# Patient Record
Sex: MALE | Race: WHITE | NOT HISPANIC OR LATINO | Employment: OTHER | URBAN - METROPOLITAN AREA
[De-identification: names, ages, dates, MRNs, and addresses within clinical notes are randomized per-mention and may not be internally consistent; named-entity substitution may affect disease eponyms.]

---

## 2018-11-25 ENCOUNTER — HOSPITAL ENCOUNTER (EMERGENCY)
Facility: HOSPITAL | Age: 43
Discharge: HOME | End: 2018-11-25
Attending: EMERGENCY MEDICINE | Admitting: EMERGENCY MEDICINE

## 2018-11-25 ENCOUNTER — APPOINTMENT (OUTPATIENT)
Dept: CT IMAGING | Facility: HOSPITAL | Age: 43
End: 2018-11-25

## 2018-11-25 VITALS
OXYGEN SATURATION: 98 % | HEART RATE: 92 BPM | DIASTOLIC BLOOD PRESSURE: 88 MMHG | TEMPERATURE: 97.8 F | WEIGHT: 187.9 LBS | SYSTOLIC BLOOD PRESSURE: 129 MMHG | HEIGHT: 75 IN | BODY MASS INDEX: 23.36 KG/M2 | RESPIRATION RATE: 16 BRPM

## 2018-11-25 DIAGNOSIS — N20.1 LEFT URETERAL STONE: Primary | ICD-10-CM

## 2018-11-25 LAB
ALBUMIN SERPL-MCNC: 4.5 G/DL (ref 3.5–5.2)
ALBUMIN/GLOB SERPL: 1.4 G/DL
ALP SERPL-CCNC: 94 U/L (ref 39–117)
ALT SERPL W P-5'-P-CCNC: 20 U/L (ref 1–41)
ANION GAP SERPL CALCULATED.3IONS-SCNC: 14.4 MMOL/L
AST SERPL-CCNC: 16 U/L (ref 1–40)
BACTERIA UR QL AUTO: ABNORMAL /HPF
BASOPHILS # BLD AUTO: 0.04 10*3/MM3 (ref 0–0.2)
BASOPHILS NFR BLD AUTO: 0.3 % (ref 0–1.5)
BILIRUB SERPL-MCNC: 0.5 MG/DL (ref 0.1–1.2)
BILIRUB UR QL STRIP: NEGATIVE
BUN BLD-MCNC: 13 MG/DL (ref 6–20)
BUN/CREAT SERPL: 11.1 (ref 7–25)
CALCIUM SPEC-SCNC: 9.3 MG/DL (ref 8.6–10.5)
CHLORIDE SERPL-SCNC: 103 MMOL/L (ref 98–107)
CLARITY UR: CLEAR
CO2 SERPL-SCNC: 25.6 MMOL/L (ref 22–29)
COLOR UR: YELLOW
CREAT BLD-MCNC: 1.17 MG/DL (ref 0.76–1.27)
DEPRECATED RDW RBC AUTO: 39.2 FL (ref 37–54)
EOSINOPHIL # BLD AUTO: 0.18 10*3/MM3 (ref 0–0.7)
EOSINOPHIL NFR BLD AUTO: 1.4 % (ref 0.3–6.2)
ERYTHROCYTE [DISTWIDTH] IN BLOOD BY AUTOMATED COUNT: 12.2 % (ref 11.5–14.5)
GFR SERPL CREATININE-BSD FRML MDRD: 68 ML/MIN/1.73
GLOBULIN UR ELPH-MCNC: 3.3 GM/DL
GLUCOSE BLD-MCNC: 147 MG/DL (ref 65–99)
GLUCOSE UR STRIP-MCNC: NEGATIVE MG/DL
HCT VFR BLD AUTO: 42 % (ref 40.4–52.2)
HGB BLD-MCNC: 15 G/DL (ref 13.7–17.6)
HGB UR QL STRIP.AUTO: ABNORMAL
HYALINE CASTS UR QL AUTO: ABNORMAL /LPF
IMM GRANULOCYTES # BLD: 0.03 10*3/MM3 (ref 0–0.03)
IMM GRANULOCYTES NFR BLD: 0.2 % (ref 0–0.5)
KETONES UR QL STRIP: NEGATIVE
LEUKOCYTE ESTERASE UR QL STRIP.AUTO: ABNORMAL
LIPASE SERPL-CCNC: 20 U/L (ref 13–60)
LYMPHOCYTES # BLD AUTO: 2.06 10*3/MM3 (ref 0.9–4.8)
LYMPHOCYTES NFR BLD AUTO: 16.3 % (ref 19.6–45.3)
MCH RBC QN AUTO: 31.6 PG (ref 27–32.7)
MCHC RBC AUTO-ENTMCNC: 35.7 G/DL (ref 32.6–36.4)
MCV RBC AUTO: 88.6 FL (ref 79.8–96.2)
MONOCYTES # BLD AUTO: 0.89 10*3/MM3 (ref 0.2–1.2)
MONOCYTES NFR BLD AUTO: 7.1 % (ref 5–12)
NEUTROPHILS # BLD AUTO: 9.43 10*3/MM3 (ref 1.9–8.1)
NEUTROPHILS NFR BLD AUTO: 74.9 % (ref 42.7–76)
NITRITE UR QL STRIP: NEGATIVE
PH UR STRIP.AUTO: 6.5 [PH] (ref 5–8)
PLATELET # BLD AUTO: 246 10*3/MM3 (ref 140–500)
PMV BLD AUTO: 10.2 FL (ref 6–12)
POTASSIUM BLD-SCNC: 3.8 MMOL/L (ref 3.5–5.2)
PROT SERPL-MCNC: 7.8 G/DL (ref 6–8.5)
PROT UR QL STRIP: ABNORMAL
RBC # BLD AUTO: 4.74 10*6/MM3 (ref 4.6–6)
RBC # UR: ABNORMAL /HPF
REF LAB TEST METHOD: ABNORMAL
SODIUM BLD-SCNC: 143 MMOL/L (ref 136–145)
SP GR UR STRIP: 1.02 (ref 1–1.03)
SQUAMOUS #/AREA URNS HPF: ABNORMAL /HPF
UROBILINOGEN UR QL STRIP: ABNORMAL
WBC NRBC COR # BLD: 12.6 10*3/MM3 (ref 4.5–10.7)
WBC UR QL AUTO: ABNORMAL /HPF

## 2018-11-25 PROCEDURE — 99283 EMERGENCY DEPT VISIT LOW MDM: CPT

## 2018-11-25 PROCEDURE — 25010000002 KETOROLAC TROMETHAMINE PER 15 MG: Performed by: PHYSICIAN ASSISTANT

## 2018-11-25 PROCEDURE — 80053 COMPREHEN METABOLIC PANEL: CPT | Performed by: PHYSICIAN ASSISTANT

## 2018-11-25 PROCEDURE — 96374 THER/PROPH/DIAG INJ IV PUSH: CPT

## 2018-11-25 PROCEDURE — 85025 COMPLETE CBC W/AUTO DIFF WBC: CPT | Performed by: PHYSICIAN ASSISTANT

## 2018-11-25 PROCEDURE — 74176 CT ABD & PELVIS W/O CONTRAST: CPT

## 2018-11-25 PROCEDURE — 83690 ASSAY OF LIPASE: CPT | Performed by: PHYSICIAN ASSISTANT

## 2018-11-25 PROCEDURE — 96375 TX/PRO/DX INJ NEW DRUG ADDON: CPT

## 2018-11-25 PROCEDURE — 81001 URINALYSIS AUTO W/SCOPE: CPT | Performed by: PHYSICIAN ASSISTANT

## 2018-11-25 PROCEDURE — 96361 HYDRATE IV INFUSION ADD-ON: CPT

## 2018-11-25 RX ORDER — SODIUM CHLORIDE 0.9 % (FLUSH) 0.9 %
10 SYRINGE (ML) INJECTION AS NEEDED
Status: DISCONTINUED | OUTPATIENT
Start: 2018-11-25 | End: 2018-11-25 | Stop reason: HOSPADM

## 2018-11-25 RX ORDER — KETOROLAC TROMETHAMINE 30 MG/ML
30 INJECTION, SOLUTION INTRAMUSCULAR; INTRAVENOUS ONCE
Status: COMPLETED | OUTPATIENT
Start: 2018-11-25 | End: 2018-11-25

## 2018-11-25 RX ORDER — IBUPROFEN 200 MG
200 TABLET ORAL EVERY 6 HOURS PRN
COMMUNITY

## 2018-11-25 RX ORDER — OXYCODONE HYDROCHLORIDE AND ACETAMINOPHEN 5; 325 MG/1; MG/1
1 TABLET ORAL EVERY 6 HOURS PRN
Qty: 15 TABLET | Refills: 0 | OUTPATIENT
Start: 2018-11-25 | End: 2023-03-15

## 2018-11-25 RX ORDER — TAMSULOSIN HYDROCHLORIDE 0.4 MG/1
1 CAPSULE ORAL NIGHTLY
Qty: 20 CAPSULE | Refills: 0 | OUTPATIENT
Start: 2018-11-25 | End: 2023-03-15

## 2018-11-25 RX ORDER — ONDANSETRON 4 MG/1
4 TABLET, ORALLY DISINTEGRATING ORAL EVERY 8 HOURS PRN
Qty: 15 TABLET | Refills: 0 | OUTPATIENT
Start: 2018-11-25 | End: 2023-03-15

## 2018-11-25 RX ORDER — LORAZEPAM 2 MG/ML
1 INJECTION INTRAMUSCULAR ONCE
Status: DISCONTINUED | OUTPATIENT
Start: 2018-11-25 | End: 2018-11-25

## 2018-11-25 RX ADMIN — SODIUM CHLORIDE 1000 ML: 9 INJECTION, SOLUTION INTRAVENOUS at 03:26

## 2018-11-25 RX ADMIN — KETOROLAC TROMETHAMINE 30 MG: 30 INJECTION, SOLUTION INTRAMUSCULAR at 03:29

## 2023-03-15 ENCOUNTER — HOSPITAL ENCOUNTER (EMERGENCY)
Facility: HOSPITAL | Age: 48
Discharge: HOME | End: 2023-03-15
Attending: EMERGENCY MEDICINE | Admitting: EMERGENCY MEDICINE
Payer: MEDICAID

## 2023-03-15 VITALS
TEMPERATURE: 99.1 F | OXYGEN SATURATION: 99 % | RESPIRATION RATE: 18 BRPM | WEIGHT: 205 LBS | HEIGHT: 75 IN | BODY MASS INDEX: 25.49 KG/M2 | SYSTOLIC BLOOD PRESSURE: 146 MMHG | DIASTOLIC BLOOD PRESSURE: 95 MMHG | HEART RATE: 85 BPM

## 2023-03-15 DIAGNOSIS — B02.8 HERPES ZOSTER WITH COMPLICATION: Primary | ICD-10-CM

## 2023-03-15 PROCEDURE — 99282 EMERGENCY DEPT VISIT SF MDM: CPT

## 2023-03-27 ENCOUNTER — OFFICE VISIT (OUTPATIENT)
Dept: FAMILY MEDICINE CLINIC | Facility: CLINIC | Age: 48
End: 2023-03-27
Payer: MEDICAID

## 2023-03-27 DIAGNOSIS — B02.8 HERPES ZOSTER WITH OTHER COMPLICATION: Primary | ICD-10-CM

## 2023-03-27 DIAGNOSIS — F41.9 ANXIETY: ICD-10-CM

## 2023-03-27 RX ORDER — ACETAMINOPHEN 500 MG
500 TABLET ORAL EVERY 6 HOURS PRN
COMMUNITY

## 2023-03-27 RX ORDER — MULTIVIT WITH MINERALS/LUTEIN
250 TABLET ORAL DAILY
COMMUNITY

## 2023-03-27 RX ORDER — MULTIPLE VITAMINS W/ MINERALS TAB 9MG-400MCG
1 TAB ORAL DAILY
COMMUNITY

## 2023-03-29 VITALS
SYSTOLIC BLOOD PRESSURE: 150 MMHG | OXYGEN SATURATION: 97 % | BODY MASS INDEX: 25.99 KG/M2 | HEART RATE: 95 BPM | HEIGHT: 75 IN | WEIGHT: 209 LBS | DIASTOLIC BLOOD PRESSURE: 114 MMHG | TEMPERATURE: 98.4 F

## 2023-03-29 PROBLEM — B02.9 SHINGLES: Status: ACTIVE | Noted: 2023-03-29

## 2023-03-29 PROBLEM — F41.9 ANXIETY: Status: ACTIVE | Noted: 2023-03-29

## 2023-03-31 ENCOUNTER — PATIENT ROUNDING (BHMG ONLY) (OUTPATIENT)
Dept: FAMILY MEDICINE CLINIC | Facility: CLINIC | Age: 48
End: 2023-03-31
Payer: MEDICAID

## 2023-04-11 ENCOUNTER — OFFICE VISIT (OUTPATIENT)
Dept: FAMILY MEDICINE CLINIC | Facility: CLINIC | Age: 48
End: 2023-04-11
Payer: MEDICAID

## 2023-04-11 VITALS
SYSTOLIC BLOOD PRESSURE: 132 MMHG | DIASTOLIC BLOOD PRESSURE: 78 MMHG | WEIGHT: 205 LBS | BODY MASS INDEX: 25.49 KG/M2 | HEART RATE: 73 BPM | OXYGEN SATURATION: 99 % | TEMPERATURE: 97.5 F | HEIGHT: 75 IN

## 2023-04-11 DIAGNOSIS — L02.215 PERINEAL ABSCESS: Primary | ICD-10-CM

## 2023-04-11 DIAGNOSIS — B02.9 HERPES ZOSTER WITHOUT COMPLICATION: ICD-10-CM

## 2023-04-11 PROCEDURE — 1160F RVW MEDS BY RX/DR IN RCRD: CPT | Performed by: NURSE PRACTITIONER

## 2023-04-11 PROCEDURE — 1159F MED LIST DOCD IN RCRD: CPT | Performed by: NURSE PRACTITIONER

## 2023-04-11 PROCEDURE — 99213 OFFICE O/P EST LOW 20 MIN: CPT | Performed by: NURSE PRACTITIONER

## 2023-04-11 RX ORDER — CLINDAMYCIN HYDROCHLORIDE 300 MG/1
300 CAPSULE ORAL 3 TIMES DAILY
Qty: 30 CAPSULE | Refills: 0 | Status: SHIPPED | OUTPATIENT
Start: 2023-04-11

## 2023-04-17 ENCOUNTER — TELEPHONE (OUTPATIENT)
Dept: FAMILY MEDICINE CLINIC | Facility: CLINIC | Age: 48
End: 2023-04-17
Payer: MEDICAID

## 2023-04-18 LAB
BACTERIA SPEC AEROBE CULT: ABNORMAL
BACTERIA SPEC ANAEROBE CULT: ABNORMAL
BACTERIA SPEC CULT: ABNORMAL
OTHER ANTIBIOTIC SUSC ISLT: ABNORMAL

## 2023-04-19 ENCOUNTER — TELEPHONE (OUTPATIENT)
Dept: FAMILY MEDICINE CLINIC | Facility: CLINIC | Age: 48
End: 2023-04-19

## 2023-04-19 RX ORDER — CIPROFLOXACIN 500 MG/1
500 TABLET, FILM COATED ORAL 2 TIMES DAILY
Qty: 14 TABLET | Refills: 0 | Status: SHIPPED | OUTPATIENT
Start: 2023-04-19

## 2023-04-22 PROBLEM — L02.215 PERINEAL ABSCESS: Status: ACTIVE | Noted: 2023-04-22

## 2023-05-15 ENCOUNTER — PREP FOR SURGERY (OUTPATIENT)
Dept: OTHER | Facility: HOSPITAL | Age: 48
End: 2023-05-15
Payer: MEDICAID

## 2023-05-15 ENCOUNTER — OFFICE VISIT (OUTPATIENT)
Dept: SURGERY | Facility: CLINIC | Age: 48
End: 2023-05-15
Payer: MEDICAID

## 2023-05-15 VITALS — WEIGHT: 203.6 LBS | HEIGHT: 75 IN | BODY MASS INDEX: 25.31 KG/M2

## 2023-05-15 DIAGNOSIS — Z12.11 SCREENING FOR COLORECTAL CANCER: Primary | ICD-10-CM

## 2023-05-15 DIAGNOSIS — Z12.12 SCREENING FOR COLORECTAL CANCER: Primary | ICD-10-CM

## 2023-05-15 DIAGNOSIS — L02.215 PERINEAL ABSCESS: Primary | ICD-10-CM

## 2023-05-15 PROCEDURE — 1160F RVW MEDS BY RX/DR IN RCRD: CPT | Performed by: SURGERY

## 2023-05-15 PROCEDURE — 99202 OFFICE O/P NEW SF 15 MIN: CPT | Performed by: SURGERY

## 2023-05-15 PROCEDURE — 1159F MED LIST DOCD IN RCRD: CPT | Performed by: SURGERY

## 2023-05-15 RX ORDER — SODIUM CHLORIDE 0.9 % (FLUSH) 0.9 %
10 SYRINGE (ML) INJECTION EVERY 12 HOURS SCHEDULED
OUTPATIENT
Start: 2023-05-15

## 2023-05-15 RX ORDER — SODIUM CHLORIDE 0.9 % (FLUSH) 0.9 %
1-10 SYRINGE (ML) INJECTION AS NEEDED
OUTPATIENT
Start: 2023-05-15

## 2023-12-07 ENCOUNTER — TELEPHONE (OUTPATIENT)
Dept: FAMILY MEDICINE CLINIC | Facility: CLINIC | Age: 48
End: 2023-12-07

## 2023-12-08 ENCOUNTER — OFFICE VISIT (OUTPATIENT)
Dept: FAMILY MEDICINE CLINIC | Facility: CLINIC | Age: 48
End: 2023-12-08
Payer: MEDICAID

## 2023-12-08 VITALS
DIASTOLIC BLOOD PRESSURE: 84 MMHG | HEART RATE: 92 BPM | HEIGHT: 75 IN | SYSTOLIC BLOOD PRESSURE: 154 MMHG | WEIGHT: 206 LBS | BODY MASS INDEX: 25.61 KG/M2 | OXYGEN SATURATION: 95 %

## 2023-12-08 DIAGNOSIS — L02.215 PERINEAL ABSCESS: Primary | ICD-10-CM

## 2023-12-08 DIAGNOSIS — R03.0 ELEVATED BLOOD PRESSURE READING: ICD-10-CM

## 2023-12-08 PROCEDURE — 99213 OFFICE O/P EST LOW 20 MIN: CPT | Performed by: NURSE PRACTITIONER

## 2023-12-08 PROCEDURE — 1159F MED LIST DOCD IN RCRD: CPT | Performed by: NURSE PRACTITIONER

## 2023-12-08 PROCEDURE — 1160F RVW MEDS BY RX/DR IN RCRD: CPT | Performed by: NURSE PRACTITIONER

## 2023-12-08 RX ORDER — CLINDAMYCIN HYDROCHLORIDE 300 MG/1
300 CAPSULE ORAL 3 TIMES DAILY
Qty: 30 CAPSULE | Refills: 0 | Status: SHIPPED | OUTPATIENT
Start: 2023-12-08

## 2024-10-02 ENCOUNTER — OFFICE VISIT (OUTPATIENT)
Dept: FAMILY MEDICINE CLINIC | Facility: CLINIC | Age: 49
End: 2024-10-02
Payer: MEDICAID

## 2024-10-02 VITALS
DIASTOLIC BLOOD PRESSURE: 96 MMHG | HEART RATE: 104 BPM | WEIGHT: 205.8 LBS | HEIGHT: 75 IN | OXYGEN SATURATION: 97 % | BODY MASS INDEX: 25.59 KG/M2 | SYSTOLIC BLOOD PRESSURE: 162 MMHG

## 2024-10-02 DIAGNOSIS — L02.215 PERINEAL ABSCESS: ICD-10-CM

## 2024-10-02 PROCEDURE — 99213 OFFICE O/P EST LOW 20 MIN: CPT | Performed by: NURSE PRACTITIONER

## 2024-10-02 RX ORDER — CLINDAMYCIN HCL 300 MG
300 CAPSULE ORAL 3 TIMES DAILY
Qty: 30 CAPSULE | Refills: 0 | Status: SHIPPED | OUTPATIENT
Start: 2024-10-02

## 2024-10-28 ENCOUNTER — OFFICE VISIT (OUTPATIENT)
Dept: SURGERY | Facility: CLINIC | Age: 49
End: 2024-10-28
Payer: MEDICAID

## 2024-10-28 VITALS
HEART RATE: 108 BPM | WEIGHT: 208.4 LBS | HEIGHT: 75 IN | DIASTOLIC BLOOD PRESSURE: 90 MMHG | SYSTOLIC BLOOD PRESSURE: 138 MMHG | OXYGEN SATURATION: 98 % | BODY MASS INDEX: 25.91 KG/M2

## 2024-10-28 DIAGNOSIS — K60.30 ANAL FISTULA: Primary | ICD-10-CM

## 2024-10-28 DIAGNOSIS — Z83.719 FAMILY HISTORY OF COLONIC POLYPS: ICD-10-CM

## 2024-10-28 RX ORDER — SODIUM CHLORIDE 0.9 % (FLUSH) 0.9 %
3 SYRINGE (ML) INJECTION EVERY 12 HOURS SCHEDULED
OUTPATIENT
Start: 2024-10-28

## 2024-10-28 RX ORDER — SODIUM CHLORIDE, SODIUM LACTATE, POTASSIUM CHLORIDE, CALCIUM CHLORIDE 600; 310; 30; 20 MG/100ML; MG/100ML; MG/100ML; MG/100ML
30 INJECTION, SOLUTION INTRAVENOUS CONTINUOUS
OUTPATIENT
Start: 2024-10-28 | End: 2024-10-29

## 2024-10-28 RX ORDER — SODIUM CHLORIDE 0.9 % (FLUSH) 0.9 %
3-10 SYRINGE (ML) INJECTION AS NEEDED
OUTPATIENT
Start: 2024-10-28

## 2024-10-28 RX ORDER — SODIUM CHLORIDE 9 MG/ML
40 INJECTION, SOLUTION INTRAVENOUS AS NEEDED
OUTPATIENT
Start: 2024-10-28 | End: 2024-10-28

## 2024-10-28 NOTE — PROGRESS NOTES
Fawad Laird is a 49 y.o. male who is seen as a consult at the request of TUAN Godoy for Abscess and fistula.      HPI:  Pt presents today for evaluation of recurrent perianal abscesses.   First abscess occurred in 04/2023, which drained spontaneously.   He was seen by Dr. Aldana 05/15/2023 and was offered a colonoscopy as well as an exam under general anesthesia with possible fistulotomy if symptoms returned.   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  His off season is January-February.   Pt also expresses significant anxiety in regards to anesthesia and surgery.     Symptoms returned in 12/2023, but states that it was more mild than the initial flare-up.   It again spontaneously drained.   He had anther episode 3-4 weeks ago and again last week.  Started Clindamycin 10/02/2024.   He reports current drainage, but no fevers.     Not taking any anticoagulation.   No previous anorectal surgeries.   Pt denies ever scheduling or undergoing a colonoscopy.   Mother and sister with Hx of colon polyps.  Sister was approximately 47 years old when diagnosed.  Father with Hx of diverticulitis and is S/P colon resection.      Past Medical History:   Diagnosis Date    Anxiety     Kidney stone November 2018    Just one stone. Was able to pass    Skin cancer 2014    Basal cell on face. Was removed.       Past Surgical History:   Procedure Laterality Date    ADENOIDECTOMY      BASAL CELL CARCINOMA EXCISION      face    INCISION AND DRAINAGE ABSCESS Right     rt posterior knee    WISDOM TOOTH EXTRACTION Bilateral        Social History:   reports that he has never smoked. He has never used smokeless tobacco. He reports current alcohol use of about 2.0 standard drinks of alcohol per week. He reports that he does not use drugs.      Marriage status:     Family History   Problem Relation Age of Onset    Cancer Mother         Breast cancer  2000    Hypertension Mother     Anxiety disorder Mother     Heart disease Father         5 way bypass 2000; stints about 10 years after bypass    Hypertension Father     Colon cancer Paternal Grandmother          Current Outpatient Medications:     ibuprofen (ADVIL,MOTRIN) 200 MG tablet, Take 1 tablet by mouth Every 6 (Six) Hours As Needed for Mild Pain., Disp: , Rfl:     Melatonin 1 MG capsule, , Disp: , Rfl:     multivitamin with minerals tablet tablet, Take 1 tablet by mouth Daily., Disp: , Rfl:     vitamin C (ASCORBIC ACID) 250 MG tablet, Take 1 tablet by mouth Daily., Disp: , Rfl:     Allergy  Cephalosporins, Penicillins, and Sulfa antibiotics      Vitals:    10/28/24 1112   BP: 138/90   Pulse: 108   SpO2: 98%     Body mass index is 26.05 kg/m².        Physical Exam  Exam conducted with a chaperone present.   Constitutional:       General: He is not in acute distress.     Appearance: He is well-developed.   HENT:      Head: Normocephalic and atraumatic.      Nose: Nose normal.   Eyes:      Conjunctiva/sclera: Conjunctivae normal.      Pupils: Pupils are equal, round, and reactive to light.   Neck:      Trachea: No tracheal deviation.   Pulmonary:      Effort: Pulmonary effort is normal. No respiratory distress.      Breath sounds: Normal breath sounds.   Abdominal:      General: Bowel sounds are normal. There is no distension.      Palpations: Abdomen is soft.   Genitourinary:     Comments: Perianal exam: External fistula tract located anteriorly approximately 2.5-3 cm from anal verge. Fistula Connolly probe inserted and tracking towards rectum. Anal skin tags. External hemorrhoids WNL.   BERNA- Good tone, no masses.  Anoscopy: Deferred by Pt.   Musculoskeletal:         General: No deformity. Normal range of motion.      Cervical back: Normal range of motion.   Skin:     General: Skin is warm and dry.   Neurological:      Mental Status: He is alert and oriented to person, place, and time.      Cranial Nerves: No  cranial nerve deficit.      Coordination: Coordination normal.      Gait: Gait normal.   Psychiatric:         Behavior: Behavior normal.         Judgment: Judgment normal.         Review of Medical Record:  I reviewed medical records as detailed in HPI.     Assessment:   1. Anal fistula    2. Family history of colonic polyps    - New    Plan:  - Discussed physical exam findings with Pt.  - Recommended a fistulotomy. Discussed procedure, typical recovery time (~6 weeks), time needed off work, and post-op pain management discussed.. Risks associated with this procedure discussed at length including post-operative pain, bleeding, infection, and fecal incontinence. Discussed possible seton placement and potential need for additional procedures. Mucous drainage is to be expected after this procedure and will taper off as healing occurs. Pt expresses significant anxiety about scheduling this procedure. Would like to go home and consider when would be best to schedule surgery around work. Encouraged him not to continue to delay care. Pt states he will call and schedule fistulotomy as well as a colonoscopy.

## 2024-10-29 PROBLEM — K60.30 ANAL FISTULA: Status: ACTIVE | Noted: 2024-10-28

## 2024-12-18 ENCOUNTER — PRE-ADMISSION TESTING (OUTPATIENT)
Dept: PREADMISSION TESTING | Facility: HOSPITAL | Age: 49
End: 2024-12-18
Payer: MEDICAID

## 2024-12-18 VITALS
TEMPERATURE: 97.3 F | DIASTOLIC BLOOD PRESSURE: 91 MMHG | RESPIRATION RATE: 20 BRPM | SYSTOLIC BLOOD PRESSURE: 153 MMHG | OXYGEN SATURATION: 99 % | WEIGHT: 207 LBS | HEIGHT: 75 IN | HEART RATE: 120 BPM | BODY MASS INDEX: 25.74 KG/M2

## 2024-12-18 DIAGNOSIS — K60.30 ANAL FISTULA: ICD-10-CM

## 2024-12-18 LAB
ANION GAP SERPL CALCULATED.3IONS-SCNC: 11.2 MMOL/L (ref 5–15)
BUN SERPL-MCNC: 10 MG/DL (ref 6–20)
BUN/CREAT SERPL: 10.2 (ref 7–25)
CALCIUM SPEC-SCNC: 9.3 MG/DL (ref 8.6–10.5)
CHLORIDE SERPL-SCNC: 102 MMOL/L (ref 98–107)
CO2 SERPL-SCNC: 23.8 MMOL/L (ref 22–29)
CREAT SERPL-MCNC: 0.98 MG/DL (ref 0.76–1.27)
DEPRECATED RDW RBC AUTO: 43.7 FL (ref 37–54)
EGFRCR SERPLBLD CKD-EPI 2021: 94.5 ML/MIN/1.73
ERYTHROCYTE [DISTWIDTH] IN BLOOD BY AUTOMATED COUNT: 13 % (ref 12.3–15.4)
GLUCOSE SERPL-MCNC: 129 MG/DL (ref 65–99)
HCT VFR BLD AUTO: 46.8 % (ref 37.5–51)
HGB BLD-MCNC: 15.8 G/DL (ref 13–17.7)
MCH RBC QN AUTO: 31.2 PG (ref 26.6–33)
MCHC RBC AUTO-ENTMCNC: 33.8 G/DL (ref 31.5–35.7)
MCV RBC AUTO: 92.3 FL (ref 79–97)
PLATELET # BLD AUTO: 263 10*3/MM3 (ref 140–450)
PMV BLD AUTO: 9.9 FL (ref 6–12)
POTASSIUM SERPL-SCNC: 3.6 MMOL/L (ref 3.5–5.2)
QT INTERVAL: 361 MS
QTC INTERVAL: 422 MS
RBC # BLD AUTO: 5.07 10*6/MM3 (ref 4.14–5.8)
SODIUM SERPL-SCNC: 137 MMOL/L (ref 136–145)
WBC NRBC COR # BLD AUTO: 6.03 10*3/MM3 (ref 3.4–10.8)

## 2024-12-18 PROCEDURE — 93005 ELECTROCARDIOGRAM TRACING: CPT

## 2024-12-18 PROCEDURE — 85027 COMPLETE CBC AUTOMATED: CPT

## 2024-12-18 PROCEDURE — 80048 BASIC METABOLIC PNL TOTAL CA: CPT

## 2024-12-18 PROCEDURE — 36415 COLL VENOUS BLD VENIPUNCTURE: CPT

## 2024-12-18 NOTE — DISCHARGE INSTRUCTIONS
Take the following medications the morning of surgery:    none    If you are on prescription narcotic pain medication to control your pain you may also take that medication the morning of surgery.    General Instructions:    Follow your surgeons instructions regarding when to stop solid foods and when to stop liquids.   Verify with your surgeon if you are to complete a bowel prep and when to do so.  Patients who avoid smoking, chewing tobacco and alcohol for 4 weeks prior to surgery have a reduced risk of post-operative complications.  Quit smoking as many days before surgery as you can.  Do not smoke, use chewing tobacco or drink alcohol the day of surgery.   If applicable bring your C-PAP/ BI-PAP machine in with you to preop day of surgery.  Bring any papers given to you in the doctor’s office.  Wear clean comfortable clothes.  Do not wear contact lenses, false eyelashes or make-up.  Bring a case for your glasses.   Bring crutches or walker if applicable.  Remove all piercings.  Leave jewelry and any other valuables at home.  Hair extensions with metal clips must be removed prior to surgery.  The Pre-Admission Testing nurse will instruct you to bring medications if unable to obtain an accurate list in Pre-Admission Testing.        If you were given a blood bank ID arm band remember to bring it with you the day of surgery.    Preventing a Surgical Site Infection:  For 2 to 3 days before surgery, avoid shaving with a razor because the razor can irritate skin and make it easier to develop an infection.    Any areas of open skin can increase the risk of a post-operative wound infection by allowing bacteria to enter and travel throughout the body.  Notify your surgeon if you have any skin wounds / rashes even if it is not near the expected surgical site.  The area will need assessed to determine if surgery should be delayed until it is healed.  The night prior to surgery shower using a fresh bar of anti-bacterial soap  (such as Dial) and clean washcloth.  Sleep in a clean bed with clean clothing.  Do not allow pets to sleep with you.  Shower on the morning of surgery using a fresh bar of anti-bacterial soap (such as Dial) and clean washcloth.  Dry with a clean towel and dress in clean clothing.  Ask your surgeon if you will be receiving antibiotics prior to surgery.  Make sure you, your family, and all healthcare providers clean their hands with soap and water or an alcohol based hand  before caring for you or your wound.    Day of surgery:1/9/2025  Your arrival time is approximately two hours before your scheduled surgery time.  Upon arrival, a Pre-op nurse and Anesthesiologist will review your health history, obtain vital signs, and answer questions you may have.  The only belongings needed at this time will be a list of your home medications and if applicable your C-PAP/BI-PAP machine.  A Pre-op nurse will start an IV and you may receive medication in preparation for surgery, including something to help you relax.     Please be aware that surgery does come with discomfort.  We want to make every effort to control your discomfort so please discuss any uncontrolled symptoms with your nurse.   Your doctor will most likely have prescribed pain medications.      If you are going home after surgery you will receive individualized written care instructions before being discharged.  A responsible adult must drive you to and from the hospital on the day of your surgery and stay with you for 24 hours.  Discharge prescriptions can be filled by the hospital pharmacy during regular pharmacy hours.  If you are having surgery late in the day/evening your prescription may be e-prescribed to your pharmacy.  Please verify your pharmacy hours or chose a 24 hour pharmacy to avoid not having access to your prescription because your pharmacy has closed for the day.    If you are staying overnight following surgery, you will be transported  to your hospital room following the recovery period.  [facility] has all private rooms.    If you have any questions please call Pre-Admission Testing at [phone].  Deductibles and co-payments are collected on the day of service. Please be prepared to pay the required co-pay, deductible or deposit on the day of service as defined by your plan.    Call your surgeon immediately if you experience any of the following symptoms:  Sore Throat  Shortness of Breath or difficulty breathing  Cough  Chills  Body soreness or muscle pain  Headache  Fever  New loss of taste or smell  Do not arrive for your surgery ill.  Your procedure will need to be rescheduled to another time.  You will need to call your physician before the day of surgery to avoid any unnecessary exposure to hospital staff as well as other patients.

## 2025-01-07 ENCOUNTER — TELEPHONE (OUTPATIENT)
Dept: SURGERY | Facility: CLINIC | Age: 50
End: 2025-01-07
Payer: MEDICAID

## 2025-01-07 NOTE — TELEPHONE ENCOUNTER
Spoke with pt. Arrival time for procedure has been moved up to 6am Thursday. Pt verbally understood arrival time, prep times.     Pt had several questions on recovery time, postoperative healing, post op medications, follow up procedures.     I advised pt that everyone heals a little differently but recovery time for an initial fistulotomy with possible seton placement is approximately 6 weeks. Pain medication may be prescribed if needed for a short amount of time while recovering postoperatively but to make sure to keep stools soft. Pt read the narcotics handout and verbally understands pain medication can have adverse effects while healing especially from a colon/ rectal surgery. Patient asked for suggestions on what to use as a wound dressing after procedure, I advised these questions could be answered more in depth after surgery and he should be sent home with instructions. What I have seen patients do in the past is cover the wound with an ABD pad, possibly use an overnight menstrual pad and wear loose fitting- high waist- boxer briefs so as to not rub against the procedure site. Sitz baths in tub versus purchasing something that goes over the toilet is encouraged due to location/ comfort/ cost. As for possible seton removal surgery (if seton is placed on 01/09) this second procedure would be done approx. 4-6 weeks after initial placement but could be postponed if need be. Recovery time for seton removal would likely be an an additional 4-6 weeks.     Pt was appreciative of time spent answering questions.

## 2025-01-09 ENCOUNTER — HOSPITAL ENCOUNTER (OUTPATIENT)
Facility: HOSPITAL | Age: 50
Setting detail: HOSPITAL OUTPATIENT SURGERY
Discharge: HOME | End: 2025-01-09
Attending: COLON & RECTAL SURGERY | Admitting: COLON & RECTAL SURGERY
Payer: MEDICAID

## 2025-01-09 ENCOUNTER — ANESTHESIA EVENT (OUTPATIENT)
Dept: PERIOP | Facility: HOSPITAL | Age: 50
End: 2025-01-09
Payer: MEDICAID

## 2025-01-09 ENCOUNTER — ANESTHESIA (OUTPATIENT)
Dept: PERIOP | Facility: HOSPITAL | Age: 50
End: 2025-01-09
Payer: MEDICAID

## 2025-01-09 VITALS
HEIGHT: 75 IN | BODY MASS INDEX: 25.74 KG/M2 | OXYGEN SATURATION: 100 % | TEMPERATURE: 97.9 F | SYSTOLIC BLOOD PRESSURE: 128 MMHG | WEIGHT: 207 LBS | RESPIRATION RATE: 16 BRPM | HEART RATE: 77 BPM | DIASTOLIC BLOOD PRESSURE: 89 MMHG

## 2025-01-09 DIAGNOSIS — K60.30 ANAL FISTULA: ICD-10-CM

## 2025-01-09 PROCEDURE — 25010000002 PROPOFOL 10 MG/ML EMULSION: Performed by: NURSE ANESTHETIST, CERTIFIED REGISTERED

## 2025-01-09 PROCEDURE — 25010000002 FENTANYL CITRATE (PF) 100 MCG/2ML SOLUTION: Performed by: NURSE ANESTHETIST, CERTIFIED REGISTERED

## 2025-01-09 PROCEDURE — 46999 UNLISTED PROCEDURE ANUS: CPT | Performed by: COLON & RECTAL SURGERY

## 2025-01-09 PROCEDURE — 25010000002 MIDAZOLAM PER 1 MG: Performed by: NURSE ANESTHETIST, CERTIFIED REGISTERED

## 2025-01-09 PROCEDURE — 25010000002 AZTREONAM PER 500 MG: Performed by: PHYSICIAN ASSISTANT

## 2025-01-09 PROCEDURE — 25010000002 BUPIVACAINE (PF) 0.25 % SOLUTION 10 ML VIAL: Performed by: COLON & RECTAL SURGERY

## 2025-01-09 PROCEDURE — 25010000002 KETOROLAC TROMETHAMINE PER 15 MG: Performed by: NURSE ANESTHETIST, CERTIFIED REGISTERED

## 2025-01-09 PROCEDURE — 25010000002 SUGAMMADEX 200 MG/2ML SOLUTION: Performed by: NURSE ANESTHETIST, CERTIFIED REGISTERED

## 2025-01-09 PROCEDURE — 25010000002 LIDOCAINE 1% - EPINEPHRINE 1:100000 1 %-1:100000 SOLUTION 30 ML VIAL: Performed by: COLON & RECTAL SURGERY

## 2025-01-09 PROCEDURE — 25010000002 ONDANSETRON PER 1 MG: Performed by: NURSE ANESTHETIST, CERTIFIED REGISTERED

## 2025-01-09 PROCEDURE — 25010000002 DEXAMETHASONE SODIUM PHOSPHATE 20 MG/5ML SOLUTION: Performed by: NURSE ANESTHETIST, CERTIFIED REGISTERED

## 2025-01-09 PROCEDURE — 25010000002 ESMOLOL 100 MG/10ML SOLUTION: Performed by: NURSE ANESTHETIST, CERTIFIED REGISTERED

## 2025-01-09 PROCEDURE — 25010000002 MIDAZOLAM PER 1 MG: Performed by: ANESTHESIOLOGY

## 2025-01-09 PROCEDURE — 25010000002 CLINDAMYCIN 900 MG/50ML SOLUTION: Performed by: PHYSICIAN ASSISTANT

## 2025-01-09 PROCEDURE — 25010000002 GLYCOPYRROLATE 0.2 MG/ML SOLUTION: Performed by: NURSE ANESTHETIST, CERTIFIED REGISTERED

## 2025-01-09 PROCEDURE — 25010000002 LIDOCAINE (CARDIAC): Performed by: NURSE ANESTHETIST, CERTIFIED REGISTERED

## 2025-01-09 PROCEDURE — 25810000003 LACTATED RINGERS PER 1000 ML: Performed by: ANESTHESIOLOGY

## 2025-01-09 RX ORDER — SODIUM CHLORIDE 0.9 % (FLUSH) 0.9 %
3-10 SYRINGE (ML) INJECTION AS NEEDED
Status: DISCONTINUED | OUTPATIENT
Start: 2025-01-09 | End: 2025-01-09 | Stop reason: HOSPADM

## 2025-01-09 RX ORDER — SODIUM CHLORIDE 0.9 % (FLUSH) 0.9 %
3 SYRINGE (ML) INJECTION EVERY 12 HOURS SCHEDULED
Status: DISCONTINUED | OUTPATIENT
Start: 2025-01-09 | End: 2025-01-09 | Stop reason: HOSPADM

## 2025-01-09 RX ORDER — LIDOCAINE HYDROCHLORIDE 10 MG/ML
0.5 INJECTION, SOLUTION INFILTRATION; PERINEURAL ONCE AS NEEDED
Status: DISCONTINUED | OUTPATIENT
Start: 2025-01-09 | End: 2025-01-09 | Stop reason: HOSPADM

## 2025-01-09 RX ORDER — PROPOFOL 10 MG/ML
VIAL (ML) INTRAVENOUS AS NEEDED
Status: DISCONTINUED | OUTPATIENT
Start: 2025-01-09 | End: 2025-01-09 | Stop reason: SURG

## 2025-01-09 RX ORDER — MAGNESIUM HYDROXIDE 1200 MG/15ML
LIQUID ORAL AS NEEDED
Status: DISCONTINUED | OUTPATIENT
Start: 2025-01-09 | End: 2025-01-09 | Stop reason: HOSPADM

## 2025-01-09 RX ORDER — ATROPINE SULFATE 0.4 MG/ML
0.4 INJECTION, SOLUTION INTRAMUSCULAR; INTRAVENOUS; SUBCUTANEOUS ONCE AS NEEDED
Status: DISCONTINUED | OUTPATIENT
Start: 2025-01-09 | End: 2025-01-09 | Stop reason: HOSPADM

## 2025-01-09 RX ORDER — DIPHENHYDRAMINE HYDROCHLORIDE 50 MG/ML
12.5 INJECTION INTRAMUSCULAR; INTRAVENOUS
Status: DISCONTINUED | OUTPATIENT
Start: 2025-01-09 | End: 2025-01-09 | Stop reason: HOSPADM

## 2025-01-09 RX ORDER — ONDANSETRON 4 MG/1
4 TABLET, ORALLY DISINTEGRATING ORAL ONCE AS NEEDED
Status: DISCONTINUED | OUTPATIENT
Start: 2025-01-09 | End: 2025-01-09 | Stop reason: HOSPADM

## 2025-01-09 RX ORDER — LABETALOL HYDROCHLORIDE 5 MG/ML
5 INJECTION, SOLUTION INTRAVENOUS
Status: DISCONTINUED | OUTPATIENT
Start: 2025-01-09 | End: 2025-01-09 | Stop reason: HOSPADM

## 2025-01-09 RX ORDER — HYDROCODONE BITARTRATE AND ACETAMINOPHEN 5; 325 MG/1; MG/1
1 TABLET ORAL ONCE AS NEEDED
Status: COMPLETED | OUTPATIENT
Start: 2025-01-09 | End: 2025-01-09

## 2025-01-09 RX ORDER — NALOXONE HCL 0.4 MG/ML
0.2 VIAL (ML) INJECTION AS NEEDED
Status: DISCONTINUED | OUTPATIENT
Start: 2025-01-09 | End: 2025-01-09 | Stop reason: HOSPADM

## 2025-01-09 RX ORDER — EPHEDRINE SULFATE 50 MG/ML
5 INJECTION, SOLUTION INTRAVENOUS ONCE AS NEEDED
Status: DISCONTINUED | OUTPATIENT
Start: 2025-01-09 | End: 2025-01-09 | Stop reason: HOSPADM

## 2025-01-09 RX ORDER — POLYETHYLENE GLYCOL 3350 17 G/17G
17 POWDER, FOR SOLUTION ORAL 2 TIMES DAILY
Start: 2025-01-09

## 2025-01-09 RX ORDER — ACETAMINOPHEN 160 MG
TABLET,DISINTEGRATING ORAL AS NEEDED
Status: DISCONTINUED | OUTPATIENT
Start: 2025-01-09 | End: 2025-01-09 | Stop reason: HOSPADM

## 2025-01-09 RX ORDER — ONDANSETRON 2 MG/ML
4 INJECTION INTRAMUSCULAR; INTRAVENOUS ONCE AS NEEDED
Status: COMPLETED | OUTPATIENT
Start: 2025-01-09 | End: 2025-01-09

## 2025-01-09 RX ORDER — PROMETHAZINE HYDROCHLORIDE 25 MG/1
25 SUPPOSITORY RECTAL ONCE AS NEEDED
Status: DISCONTINUED | OUTPATIENT
Start: 2025-01-09 | End: 2025-01-09 | Stop reason: HOSPADM

## 2025-01-09 RX ORDER — MIDAZOLAM HYDROCHLORIDE 1 MG/ML
1 INJECTION, SOLUTION INTRAMUSCULAR; INTRAVENOUS
Status: COMPLETED | OUTPATIENT
Start: 2025-01-09 | End: 2025-01-09

## 2025-01-09 RX ORDER — IPRATROPIUM BROMIDE AND ALBUTEROL SULFATE 2.5; .5 MG/3ML; MG/3ML
3 SOLUTION RESPIRATORY (INHALATION) ONCE AS NEEDED
Status: DISCONTINUED | OUTPATIENT
Start: 2025-01-09 | End: 2025-01-09 | Stop reason: HOSPADM

## 2025-01-09 RX ORDER — HYDROCODONE BITARTRATE AND ACETAMINOPHEN 5; 325 MG/1; MG/1
1-2 TABLET ORAL EVERY 6 HOURS PRN
Qty: 32 TABLET | Refills: 0 | Status: SHIPPED | OUTPATIENT
Start: 2025-01-09

## 2025-01-09 RX ORDER — FENTANYL CITRATE 50 UG/ML
50 INJECTION, SOLUTION INTRAMUSCULAR; INTRAVENOUS
Status: DISCONTINUED | OUTPATIENT
Start: 2025-01-09 | End: 2025-01-09 | Stop reason: HOSPADM

## 2025-01-09 RX ORDER — SODIUM CHLORIDE, SODIUM LACTATE, POTASSIUM CHLORIDE, CALCIUM CHLORIDE 600; 310; 30; 20 MG/100ML; MG/100ML; MG/100ML; MG/100ML
9 INJECTION, SOLUTION INTRAVENOUS CONTINUOUS
Status: DISCONTINUED | OUTPATIENT
Start: 2025-01-09 | End: 2025-01-09 | Stop reason: HOSPADM

## 2025-01-09 RX ORDER — ROCURONIUM BROMIDE 10 MG/ML
INJECTION, SOLUTION INTRAVENOUS AS NEEDED
Status: DISCONTINUED | OUTPATIENT
Start: 2025-01-09 | End: 2025-01-09 | Stop reason: SURG

## 2025-01-09 RX ORDER — HYDROMORPHONE HYDROCHLORIDE 1 MG/ML
0.5 INJECTION, SOLUTION INTRAMUSCULAR; INTRAVENOUS; SUBCUTANEOUS
Status: DISCONTINUED | OUTPATIENT
Start: 2025-01-09 | End: 2025-01-09 | Stop reason: HOSPADM

## 2025-01-09 RX ORDER — FLUMAZENIL 0.1 MG/ML
0.2 INJECTION INTRAVENOUS AS NEEDED
Status: DISCONTINUED | OUTPATIENT
Start: 2025-01-09 | End: 2025-01-09 | Stop reason: HOSPADM

## 2025-01-09 RX ORDER — OXYCODONE AND ACETAMINOPHEN 7.5; 325 MG/1; MG/1
1 TABLET ORAL EVERY 4 HOURS PRN
Status: DISCONTINUED | OUTPATIENT
Start: 2025-01-09 | End: 2025-01-09 | Stop reason: HOSPADM

## 2025-01-09 RX ORDER — MIDAZOLAM HYDROCHLORIDE 1 MG/ML
INJECTION, SOLUTION INTRAMUSCULAR; INTRAVENOUS AS NEEDED
Status: DISCONTINUED | OUTPATIENT
Start: 2025-01-09 | End: 2025-01-09 | Stop reason: SURG

## 2025-01-09 RX ORDER — KETOROLAC TROMETHAMINE 30 MG/ML
INJECTION, SOLUTION INTRAMUSCULAR; INTRAVENOUS AS NEEDED
Status: DISCONTINUED | OUTPATIENT
Start: 2025-01-09 | End: 2025-01-09 | Stop reason: SURG

## 2025-01-09 RX ORDER — FENTANYL CITRATE 50 UG/ML
INJECTION, SOLUTION INTRAMUSCULAR; INTRAVENOUS AS NEEDED
Status: DISCONTINUED | OUTPATIENT
Start: 2025-01-09 | End: 2025-01-09 | Stop reason: SURG

## 2025-01-09 RX ORDER — FENTANYL CITRATE 50 UG/ML
50 INJECTION, SOLUTION INTRAMUSCULAR; INTRAVENOUS ONCE AS NEEDED
Status: DISCONTINUED | OUTPATIENT
Start: 2025-01-09 | End: 2025-01-09 | Stop reason: HOSPADM

## 2025-01-09 RX ORDER — ONDANSETRON 2 MG/ML
INJECTION INTRAMUSCULAR; INTRAVENOUS AS NEEDED
Status: DISCONTINUED | OUTPATIENT
Start: 2025-01-09 | End: 2025-01-09 | Stop reason: SURG

## 2025-01-09 RX ORDER — HYDRALAZINE HYDROCHLORIDE 20 MG/ML
5 INJECTION INTRAMUSCULAR; INTRAVENOUS
Status: DISCONTINUED | OUTPATIENT
Start: 2025-01-09 | End: 2025-01-09 | Stop reason: HOSPADM

## 2025-01-09 RX ORDER — LIDOCAINE 50 MG/G
1 OINTMENT TOPICAL EVERY 4 HOURS PRN
Qty: 35.44 G | Refills: 4 | Status: SHIPPED | OUTPATIENT
Start: 2025-01-09 | End: 2025-02-08

## 2025-01-09 RX ORDER — SODIUM CHLORIDE 9 MG/ML
40 INJECTION, SOLUTION INTRAVENOUS AS NEEDED
Status: DISCONTINUED | OUTPATIENT
Start: 2025-01-09 | End: 2025-01-09 | Stop reason: HOSPADM

## 2025-01-09 RX ORDER — PROMETHAZINE HYDROCHLORIDE 25 MG/1
25 TABLET ORAL ONCE AS NEEDED
Status: DISCONTINUED | OUTPATIENT
Start: 2025-01-09 | End: 2025-01-09 | Stop reason: HOSPADM

## 2025-01-09 RX ORDER — FAMOTIDINE 10 MG/ML
20 INJECTION, SOLUTION INTRAVENOUS ONCE
Status: COMPLETED | OUTPATIENT
Start: 2025-01-09 | End: 2025-01-09

## 2025-01-09 RX ORDER — ESMOLOL HYDROCHLORIDE 10 MG/ML
INJECTION INTRAVENOUS AS NEEDED
Status: DISCONTINUED | OUTPATIENT
Start: 2025-01-09 | End: 2025-01-09 | Stop reason: SURG

## 2025-01-09 RX ORDER — GLYCOPYRROLATE 0.2 MG/ML
INJECTION INTRAMUSCULAR; INTRAVENOUS AS NEEDED
Status: DISCONTINUED | OUTPATIENT
Start: 2025-01-09 | End: 2025-01-09 | Stop reason: SURG

## 2025-01-09 RX ORDER — DEXAMETHASONE SODIUM PHOSPHATE 4 MG/ML
INJECTION, SOLUTION INTRA-ARTICULAR; INTRALESIONAL; INTRAMUSCULAR; INTRAVENOUS; SOFT TISSUE AS NEEDED
Status: DISCONTINUED | OUTPATIENT
Start: 2025-01-09 | End: 2025-01-09 | Stop reason: SURG

## 2025-01-09 RX ORDER — CLINDAMYCIN PHOSPHATE 900 MG/50ML
900 INJECTION, SOLUTION INTRAVENOUS ONCE
Status: COMPLETED | OUTPATIENT
Start: 2025-01-09 | End: 2025-01-09

## 2025-01-09 RX ORDER — ACETAMINOPHEN 650 MG
TABLET, EXTENDED RELEASE ORAL AS NEEDED
Status: DISCONTINUED | OUTPATIENT
Start: 2025-01-09 | End: 2025-01-09 | Stop reason: HOSPADM

## 2025-01-09 RX ADMIN — HYDROCODONE BITARTRATE AND ACETAMINOPHEN 1 TABLET: 5; 325 TABLET ORAL at 09:37

## 2025-01-09 RX ADMIN — MIDAZOLAM 1 MG: 1 INJECTION INTRAMUSCULAR; INTRAVENOUS at 06:48

## 2025-01-09 RX ADMIN — ONDANSETRON 4 MG: 2 INJECTION, SOLUTION INTRAMUSCULAR; INTRAVENOUS at 09:37

## 2025-01-09 RX ADMIN — ESMOLOL HYDROCHLORIDE 30 MG: 100 INJECTION, SOLUTION INTRAVENOUS at 08:35

## 2025-01-09 RX ADMIN — MIDAZOLAM 1 MG: 1 INJECTION INTRAMUSCULAR; INTRAVENOUS at 06:58

## 2025-01-09 RX ADMIN — SODIUM CHLORIDE, POTASSIUM CHLORIDE, SODIUM LACTATE AND CALCIUM CHLORIDE: 600; 310; 30; 20 INJECTION, SOLUTION INTRAVENOUS at 07:55

## 2025-01-09 RX ADMIN — ESMOLOL HYDROCHLORIDE 30 MG: 100 INJECTION, SOLUTION INTRAVENOUS at 08:07

## 2025-01-09 RX ADMIN — ONDANSETRON 4 MG: 2 INJECTION INTRAMUSCULAR; INTRAVENOUS at 08:43

## 2025-01-09 RX ADMIN — PROPOFOL 200 MG: 10 INJECTION, EMULSION INTRAVENOUS at 08:11

## 2025-01-09 RX ADMIN — ROCURONIUM BROMIDE 70 MG: 10 INJECTION, SOLUTION INTRAVENOUS at 08:12

## 2025-01-09 RX ADMIN — SUGAMMADEX 200 MG: 100 INJECTION, SOLUTION INTRAVENOUS at 08:45

## 2025-01-09 RX ADMIN — LIDOCAINE HYDROCHLORIDE 100 MG: 20 INJECTION, SOLUTION INTRAVENOUS at 08:11

## 2025-01-09 RX ADMIN — AZTREONAM 2 G: 2 INJECTION, POWDER, LYOPHILIZED, FOR SOLUTION INTRAMUSCULAR; INTRAVENOUS at 07:59

## 2025-01-09 RX ADMIN — DEXAMETHASONE SODIUM PHOSPHATE 10 MG: 4 INJECTION, SOLUTION INTRAMUSCULAR; INTRAVENOUS at 08:17

## 2025-01-09 RX ADMIN — GLYCOPYRROLATE 0.2 MG: 0.2 INJECTION INTRAMUSCULAR; INTRAVENOUS at 08:07

## 2025-01-09 RX ADMIN — MIDAZOLAM 2 MG: 1 INJECTION INTRAMUSCULAR; INTRAVENOUS at 08:07

## 2025-01-09 RX ADMIN — CLINDAMYCIN PHOSPHATE 900 MG: 900 INJECTION, SOLUTION INTRAVENOUS at 08:00

## 2025-01-09 RX ADMIN — KETOROLAC TROMETHAMINE 30 MG: 30 INJECTION, SOLUTION INTRAMUSCULAR at 08:31

## 2025-01-09 RX ADMIN — FENTANYL CITRATE 100 MCG: 50 INJECTION, SOLUTION INTRAMUSCULAR; INTRAVENOUS at 08:11

## 2025-01-09 RX ADMIN — FAMOTIDINE 20 MG: 10 INJECTION INTRAVENOUS at 06:48

## 2025-01-09 NOTE — ANESTHESIA PREPROCEDURE EVALUATION
" Anesthesia Evaluation     Patient summary reviewed and Nursing notes reviewed   no history of anesthetic complications:   NPO Solid Status: > 8 hours  NPO Liquid Status: > 2 hours           Airway   Mallampati: II  TM distance: >3 FB  Comment: beard  Dental - normal exam     Pulmonary - negative pulmonary ROS   Cardiovascular - negative cardio ROS  Exercise tolerance: good (4-7 METS)    ECG reviewed  Rhythm: regular      ROS comment: Sinus rhythm  Probable  left atrial enlargement  Left ventricular hypertrophy  No Prior Tracing for Comparison  Electronically Signed By: Sanna Aponte ([facility]) 2024-12-18 14:41:37      Neuro/Psych- negative ROS  (+) psychiatric history Anxiety  GI/Hepatic/Renal/Endo - negative ROS   (+) renal disease- stones    ROS Comment: ibs    Musculoskeletal (-) negative ROS    Abdominal    Substance History - negative use     OB/GYN negative ob/gyn ROS         Other      history of cancer (skin) remission                  Anesthesia Plan    ASA 2     general     (/95 (BP Location: Right arm, Patient Position: Lying)   Pulse 95   Temp 36.7 °C (98.1 °F) (Oral)   Resp 20   Ht 189.2 cm (74.5\")   Wt 93.9 kg (207 lb)   SpO2 100%   BMI 26.22 kg/m²     I have reviewed the patient's history with the patient and the chart, including all pertinent laboratory results and imaging. I have explained the risks of anesthesia including but not limited to dental damage, corneal abrasion, nerve injury, MI, stroke, and death.    )  intravenous induction     Anesthetic plan, risks, benefits, and alternatives have been provided, discussed and informed consent has been obtained with: patient.    CODE STATUS:         "

## 2025-01-09 NOTE — H&P
First abscess occurred in 04/2023, which drained spontaneously.   He was seen by Dr. Aldana 05/15/2023 and was offered a colonoscopy as well as an exam under general anesthesia with possible fistulotomy if symptoms returned.   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  His off season is January-February.   Pt also expresses significant anxiety in regards to anesthesia and surgery.      Symptoms returned in 12/2023, but states that it was more mild than the initial flare-up.   It again spontaneously drained.   He had anther episode 3-4 weeks ago and again last week.  Started Clindamycin 10/02/2024.   He reports current drainage, but no fevers.      Not taking any anticoagulation.   No previous anorectal surgeries.   Pt denies ever scheduling or undergoing a colonoscopy.   Mother and sister with Hx of colon polyps.  Sister was approximately 47 years old when diagnosed.  Father with Hx of diverticulitis and is S/P colon resection.       Medical History        Past Medical History:   Diagnosis Date    Anxiety      Kidney stone November 2018     Just one stone. Was able to pass    Skin cancer 2014     Basal cell on face. Was removed.            Surgical History         Past Surgical History:   Procedure Laterality Date    ADENOIDECTOMY        BASAL CELL CARCINOMA EXCISION         face    INCISION AND DRAINAGE ABSCESS Right       rt posterior knee    WISDOM TOOTH EXTRACTION Bilateral              Social History:   reports that he has never smoked. He has never used smokeless tobacco. He reports current alcohol use of about 2.0 standard drinks of alcohol per week. He reports that he does not use drugs.        Marriage status:            Family History   Problem Relation Age of Onset    Cancer Mother           Breast cancer 2000    Hypertension Mother      Anxiety disorder Mother      Heart disease Father           5 way bypass 2000; stints  about 10 years after bypass    Hypertension Father      Colon cancer Paternal Grandmother             Current Medications      Current Outpatient Medications:     ibuprofen (ADVIL,MOTRIN) 200 MG tablet, Take 1 tablet by mouth Every 6 (Six) Hours As Needed for Mild Pain., Disp: , Rfl:     Melatonin 1 MG capsule, , Disp: , Rfl:     multivitamin with minerals tablet tablet, Take 1 tablet by mouth Daily., Disp: , Rfl:     vitamin C (ASCORBIC ACID) 250 MG tablet, Take 1 tablet by mouth Daily., Disp: , Rfl:         Allergy  Cephalosporins, Penicillins, and Sulfa antibiotics                  Physical Exam  Exam conducted with a chaperone present.   Constitutional:       General: He is not in acute distress.     Appearance: He is well-developed.   HENT:      Head: Normocephalic and atraumatic.      Nose: Nose normal.   Eyes:      Conjunctiva/sclera: Conjunctivae normal.      Pupils: Pupils are equal, round, and reactive to light.   Neck:      Trachea: No tracheal deviation.   Pulmonary:      Effort: Pulmonary effort is normal. No respiratory distress.      Breath sounds: Normal breath sounds.   Abdominal:      General: Bowel sounds are normal. There is no distension.      Palpations: Abdomen is soft.   Genitourinary:     Comments: Perianal exam: External fistula tract located anteriorly approximately 2.5-3 cm from anal verge. Fistula Connolly probe inserted and tracking towards rectum. Anal skin tags. External hemorrhoids WNL.   BERNA- Good tone, no masses.  Anoscopy: Deferred by Pt.   Musculoskeletal:         General: No deformity. Normal range of motion.      Cervical back: Normal range of motion.   Skin:     General: Skin is warm and dry.   Neurological:      Mental Status: He is alert and oriented to person, place, and time.      Cranial Nerves: No cranial nerve deficit.      Coordination: Coordination normal.      Gait: Gait normal.   Psychiatric:         Behavior: Behavior normal.         Judgment: Judgment normal.             Review of Medical Record:  I reviewed medical records as detailed in HPI.      Assessment:   1. Anal fistula        - New     Plan:  - Discussed physical exam findings with Pt.  - Recommended a fistulotomy. Discussed procedure, typical recovery time (~6 weeks), time needed off work, and post-op pain management discussed.. Risks associated with this procedure discussed at length including post-operative pain, bleeding, infection, and fecal incontinence. Discussed possible seton placement and potential need for additional procedures. Mucous drainage is to be expected after this procedure and will taper off as healing occurs.

## 2025-01-09 NOTE — DISCHARGE INSTRUCTIONS
Dr. Carey Grimes  [address]  [place] [id number] [phone]      Discharge Instructions for Hemorrhoidectomy/Anal Fissures/Fistulas      Go home, rest and take it easy today; however, you should get up and move about several times today to reduce the risk of developing a clot in your legs.      You may experience some dizziness or memory loss from the anesthesia.  This may last for the next 24 hours.  Someone should plan on staying with you for the first 24 hours for your safety.    Do not make any important legal decisions or sign any legal papers for the next 24 hours.      Eat and drink lightly today.  Start off with liquids, jello, soup, crackers or other bland foods at first. Please drink plenty of fluids. You may advance your diet tomorrow as tolerated as long as you do not experience any nausea or vomiting.     Some patients will have packing in their rectum.  It should come out will your first bowel movement.  You may remove it sooner yourself if it bothers you.  If it comes out on its own before your first bowel movement, do not worry about it.  It does not need to be replaced.      Begin your sitz baths tomorrow.    The best method of pain relief is a sitz bath (sitting in a tub or warm water) at least 3 times daily for 10 minutes.  This helps to reduce pain and aids with hygiene/drainage.  The drainage may have an unpleasant odor.  This is not unexpected and should be controlled with baths and showers.  If the skin around the anal area becomes irritated, you may apply Vaseline, A&D ointment or a similar barrier cream to the area.       Bleeding and drainage are to be expected and may persist for as long as 2-4 weeks.  Bleeding may occur with your bowel movements as well.  Wear a cotton liner such as a Kotex pad or a panty liner inside your underwear to protect your clothing.       You have received a prescription for a narcotic pain medicine, as you will have pain following surgery.    You will not be totally pain free, but your pain medicine should make the pain tolerable.  Please take your pain medicine as prescribed and always take your pills with food to prevent nausea. Your pain may persist for 1-3 weeks. If you are having severe pain that cannot be controlled by the pain medicine, please contact me.  Typically, patients with anal fissures will have less pain than those with hemorrhoids.      The goal is for your bowel movements to be soft which will help to minimize pain.  The pain medicine used to keep your comfortable may also cause some constipation so I recommend the following:    Miralax (17 grams)--1 capfull every day starting the day after surgery.    Keep taking fiber everyday (Citrucel, Metamucil, or Fiber-con) as directed.    If you are unable to have a bowel movement by 2 days after surgery, try Milk of Magnesia, Magnesium Citrate, or Colace.  If still unable to have a bowel movement, call the office at [phone]      No driving for 24 hours and for as long as you are taking your prescription pain medicine.  You may resume your activities gradually.       You will need to call the office at [phone] to schedule a follow-up appointment in 10-21 days.    Remember to contact me for any of the following:    Fever > 100.5 degrees  Severe pain that cannot be controlled by taking your pain pills  Severe nausea or vomiting   Significant bleeding > 1/4 cup  Any other questions or concerns

## 2025-01-09 NOTE — ANESTHESIA POSTPROCEDURE EVALUATION
"Patient: Fawad Laird    Procedure Summary       Date: 01/09/25 Room / Location: [facility] OR [address] [place] [address] MAIN OR    Anesthesia Start: 0804 Anesthesia Stop: 0905    Procedure: Rectal fistulotomy (Rectum) Diagnosis:       Anal fistula      (Anal fistula [K60.30])    Surgeons: Carey Grimes MD Provider: Tess Everett MD    Anesthesia Type: general ASA Status: 2            Anesthesia Type: general    Vitals  Vitals Value Taken Time   /89 01/09/25 0930   Temp 36.6 °C (97.9 °F) 01/09/25 0902   Pulse 82 01/09/25 0947   Resp 16 01/09/25 0945   SpO2 100 % 01/09/25 0947   Vitals shown include unfiled device data.        Post Anesthesia Care and Evaluation    Patient location during evaluation: bedside  Patient participation: complete - patient participated  Level of consciousness: awake  Pain management: adequate    Airway patency: patent  Anesthetic complications: No anesthetic complications  PONV Status: controlled  Cardiovascular status: acceptable  Respiratory status: acceptable  Hydration status: acceptable    Comments: /89   Pulse 77   Temp 36.6 °C (97.9 °F) (Oral)   Resp 16   Ht 189.2 cm (74.5\")   Wt 93.9 kg (207 lb)   SpO2 100%   BMI 26.22 kg/m²       "

## 2025-01-09 NOTE — ANESTHESIA PROCEDURE NOTES
Airway  Urgency: elective    Date/Time: 1/9/2025 8:16 AM  Airway not difficult    General Information and Staff    Patient location during procedure: OR  Anesthesiologist: Tess Everett MD  CRNA/CAA: Benedicto Tomas CRNA    Indications and Patient Condition  Indications for airway management: airway protection    Preoxygenated: yes  Mask difficulty assessment: 2 - vent by mask + OA or adjuvant +/- NMBA    Final Airway Details  Final airway type: endotracheal airway      Successful airway: ETT  Cuffed: yes   Successful intubation technique: direct laryngoscopy  Facilitating devices/methods: intubating stylet and anterior pressure/BURP  Endotracheal tube insertion site: oral  Blade: Jennie  Blade size: 4  ETT size (mm): 8.0  Cormack-Lehane Classification: grade IIb - view of arytenoids or posterior of glottis only  Placement verified by: chest auscultation and capnometry   Inital cuff pressure (cm H2O): 20  Cuff volume (mL): 8  Measured from: lips  ETT/EBT  to lips (cm): 24  Number of attempts at approach: 1  Assessment: lips, teeth, and gum same as pre-op and atraumatic intubation

## 2025-01-09 NOTE — OP NOTE
Surgeon: Carey Grimes MD    Surgical  Assistant: Renuka Polk PA-C     Preoperative diagnosis: Anal fistula [K60.30]    Post-Op Diagnosis Codes:     * Anal fistula [K60.30]    Procedure: Incision and drainage of chronic perineum abscess    Estimated Blood Loss: 5 mL    Specimens: None   * No orders in the log *    Indication:  Fawad Laird is a 49 y.o. male who comes in with Anal fistula  Patient understands risks, benefits,and alternatives wishes to proceed.      Procedure Details:  The patient was brought into the operating room with SCDs in place, antibiotics infused. After general anesthesia was achieved the patient was placed prone on the operating room table. Buttocks were effaced with tape. He was prepped and draped in sterile fashion. Then 1% lidocaine with epinephrine and 0.25% Marcaine without was used as local infiltration and a perineum block. The external exam of the anal canal did not find any abnormalities. In the anterior perianal skin on the perineum approximately 6 cm from the anal verge there is an opening to a cavity. It tracks towards the scrotum. I placed Angiocath with peroxide in a syringe and injected it into the cavity and did not find any extravasation into the anal canal. Opened the cavity up using a Barr fistula probe as a guide. There was a lot of granulation tissue. I tried to find a tract going anteriorly into the rectum/the anal canal, but I could not. I injected some more peroxide into the cavity and there arose a couple of dots of peroxide that came into the anal canal, but I was unable to find an internal tract using a Barr fistula probe and I did not want to make a false tunnel, so I cauterized at the area where the fistula tract came out, the internal opening, after trying to intubate it with a Barr fistula probe but could not. I marsupialized the sides of the cavity with 2-0 Vicryl. All instrument, lap counts, and needle counts were correct.       Assistant:  Renuka Polk PA-C was responsible for performing the following activities: Retraction, Suction, Irrigation, Suturing, Closing and Placing Dressing and their skilled assistance was necessary for the success of this case

## 2025-01-23 ENCOUNTER — OFFICE VISIT (OUTPATIENT)
Dept: SURGERY | Facility: CLINIC | Age: 50
End: 2025-01-23
Payer: MEDICAID

## 2025-01-23 VITALS
DIASTOLIC BLOOD PRESSURE: 84 MMHG | HEIGHT: 75 IN | HEART RATE: 121 BPM | WEIGHT: 205 LBS | BODY MASS INDEX: 25.49 KG/M2 | OXYGEN SATURATION: 95 % | SYSTOLIC BLOOD PRESSURE: 140 MMHG

## 2025-01-23 DIAGNOSIS — L02.215 PERINEAL ABSCESS: Primary | ICD-10-CM

## 2025-01-23 PROCEDURE — 99024 POSTOP FOLLOW-UP VISIT: CPT | Performed by: PHYSICIAN ASSISTANT

## 2025-01-23 NOTE — PROGRESS NOTES
"Fawad Laird is a 49 y.o. male in for follow up of Perineal abscess    History of Present Illness  Pt is S/P I&D of a chronic perineal abscess 01/09/2025 and presents today for a post-op evaluation.   Pt states that he took Norco for the first 2 days following surgery.  After this he transitioned to Ibuprofen TID.  Pain is getting better and now taking Ibuprofen BID.  Left side more tender.  Very little bleeding or drainage.   Applying Lidocaine and keeping the area clean.   Feels that he is healing well and denies any other concerns at this time.      /84 (BP Location: Left arm, Patient Position: Sitting, Cuff Size: Adult)   Pulse (!) 121 Comment: pt states he is nervous  Ht 190.5 cm (75\")   Wt 93 kg (205 lb)   SpO2 95%   BMI 25.62 kg/m²   Body mass index is 25.62 kg/m².      PE:   Physical Exam    Physical Exam  Exam conducted with a chaperone present.   Constitutional:       General: He is not in acute distress.     Appearance: He is well-developed.   HENT:      Head: Normocephalic and atraumatic.   Abdominal:      General: There is no distension.      Palpations: Abdomen is soft.   Genitourinary:     Comments: Perianal exam: Healing perineal incision. Wound bed with pink granulation tissue. Scant mucus drainage. Suture intact with knot located at superior aspect of wound.   Musculoskeletal:         General: Normal range of motion.   Neurological:      Mental Status: He is alert.   Psychiatric:         Thought Content: Thought content normal.         Assessment & Plan    Assessment:   1. Perineal abscess    - S/P I&D 01/09/2025     Plan:  - Pt is healing as expected given his recent I&D of a chronic perineal abscess. Suture knot removed today.   - Continue local wound care  - Continue Ibuprofen and topical Lidocaine PRN for perianal discomfort  - Follow up in 2 weeks  "

## 2025-02-06 ENCOUNTER — OFFICE VISIT (OUTPATIENT)
Dept: SURGERY | Facility: CLINIC | Age: 50
End: 2025-02-06
Payer: MEDICAID

## 2025-02-06 VITALS
DIASTOLIC BLOOD PRESSURE: 82 MMHG | SYSTOLIC BLOOD PRESSURE: 124 MMHG | OXYGEN SATURATION: 97 % | BODY MASS INDEX: 25.86 KG/M2 | WEIGHT: 208 LBS | HEIGHT: 75 IN | HEART RATE: 103 BPM

## 2025-02-06 DIAGNOSIS — L02.215 PERINEAL ABSCESS: Primary | ICD-10-CM

## 2025-02-06 PROCEDURE — 99024 POSTOP FOLLOW-UP VISIT: CPT | Performed by: PHYSICIAN ASSISTANT

## 2025-02-06 NOTE — PROGRESS NOTES
"Fawad Laird is a 49 y.o. male in for follow up of Perineal abscess    Pt is S/P I&D of a chronic perineal abscess 01/09/2025 and presents today for a wound-check.  Pt very anxious and expresses a lot of concerns today.  States that the left side of the incision feels tight, swollen, and inflamed.   Notices tenderness during showers when he is cleaning the area.     He also is still experiencing a small amount of bleeding after defecation and describes the blood as appearing thicker.   He has 2-3 bowel movements daily and states that he only notices the blood after his first BM of the day.   Denies any additional RB throughout the day.  Pt is concerned that he has another fistula formation or infection given his continued tenderness and bleeding.     Using topical Lidocaine, but has concerns about this.  States that he had a skin lesions removed on the right-side of his face in the past.  He remembers using a prescribed cream and covering this with a bandage.   The area was not healing and he was told to leave the area open to air.   Pt is concerned that the topical Lidocaine may be preventing this incision from healing.     Pt c/o rectal spasms that occur at random.     Pt very cautious regarding activities as he does not want to do anything that can cause delayed healing.   Walking, but has not lifted anything heavier than groceries.     /82 (BP Location: Left arm, Patient Position: Sitting, Cuff Size: Adult)   Pulse 103   Ht 190.5 cm (75\")   Wt 94.3 kg (208 lb)   SpO2 97%   BMI 26.00 kg/m²   Body mass index is 26 kg/m².      PE:   Physical Exam  Exam conducted with a chaperone present.   Constitutional:       General: He is not in acute distress.     Appearance: He is well-developed.   HENT:      Head: Normocephalic and atraumatic.   Abdominal:      General: There is no distension.      Palpations: Abdomen is soft.   Genitourinary:     Comments: Perianal exam: Healing perineal wound. Wound edges " with thick scar tissue, especially on the left. Some loose suture present. Mild mucus drainage. No active bleeding. No surrounding erythema, edema, or purulent drainage. No signs of new external fistula tract formation or abscesses.   Musculoskeletal:         General: Normal range of motion.   Neurological:      Mental Status: He is alert.   Psychiatric:         Thought Content: Thought content normal.         Assessment:    1. Perineal abscess    - S/P I&D of a chronic perineal abscess 01/09/2025     Plan:  - Discussed physical exam findings with Pt and that there are no signs of infection or new abscess/fistula formation.   - Discussed that some of the tightness he feels is likely due to the thick scar tissue and remaining suture. Discussed that as he continues to heal, the scar tissue with soften. All remaining visible sutures were trimmed.   - Continue Sitz baths with warm water  - Okay to apply topical Lidocaine and discussed with Pt that this should not delay wound healing  - Recommended Mg supplements for rectal spasms.   - Gradually increase physical activity as tolerated.   - Pt has never had a colonoscopy and has a FHx of colon polyps in both his mother and sister. First recommended a colonoscopy back in 10/2024, but Pt was very anxious about this. Discussed with him that we can schedule this to be performed in the summer as he is expected to heal well before then, but Pt still hesitant to schedule this today. States he will call back when he is ready. Pt was given a card for our office  and instructed to call her when he is ready to schedule his colonoscopy.   - Follow up in 4 weeks for wound-check.

## 2025-03-06 ENCOUNTER — OFFICE VISIT (OUTPATIENT)
Dept: SURGERY | Facility: CLINIC | Age: 50
End: 2025-03-06
Payer: MEDICAID

## 2025-03-06 VITALS
SYSTOLIC BLOOD PRESSURE: 126 MMHG | WEIGHT: 211 LBS | HEIGHT: 75 IN | BODY MASS INDEX: 26.24 KG/M2 | OXYGEN SATURATION: 95 % | HEART RATE: 93 BPM | DIASTOLIC BLOOD PRESSURE: 84 MMHG

## 2025-03-06 DIAGNOSIS — L02.215 PERINEAL ABSCESS: Primary | ICD-10-CM

## 2025-03-06 PROCEDURE — 99024 POSTOP FOLLOW-UP VISIT: CPT | Performed by: PHYSICIAN ASSISTANT

## 2025-03-06 NOTE — PROGRESS NOTES
"Fawad Laird is a 49 y.o. male in for follow up of Perineal abscess    Pt is S/P I&D of a chronic perineal abscess 01/09/2025 and presents today for a wound-check. He states that he feels a little pulling sensation/tenderness on the left from the scar tissue, but is overall feeling better. He has multiple bowel movements each day and will notice an occasional spot of blood, but no longer needs to wear a pad due to the drainage. Has not looked at the area much since yesterday and still sees a divot.     /84 (BP Location: Left arm, Patient Position: Sitting, Cuff Size: Adult)   Pulse 93   Ht 190.5 cm (75\")   Wt 95.7 kg (211 lb)   SpO2 95%   BMI 26.37 kg/m²   Body mass index is 26.37 kg/m².      PE:   Physical Exam  Exam conducted with a chaperone present.   Constitutional:       General: He is not in acute distress.     Appearance: He is well-developed.   HENT:      Head: Normocephalic and atraumatic.   Abdominal:      General: There is no distension.      Palpations: Abdomen is soft.   Genitourinary:     Comments: Perineal wound measuring approximately 1.5 cm in length. Wound bed with pink granulation tissue and in some areas hypergranulation tissue.   Musculoskeletal:         General: Normal range of motion.   Neurological:      Mental Status: He is alert.   Psychiatric:         Thought Content: Thought content normal.       Assessment:    1. Perineal abscess    - S/P I&D of a chronic perineal abscess 01/09/2025     Plan:  - Silver Nitrate applied to perineal wound to facilitate healing.   - Continue local wound care  - Follow-up in 2 weeks.    "

## 2025-03-20 ENCOUNTER — OFFICE VISIT (OUTPATIENT)
Dept: SURGERY | Facility: CLINIC | Age: 50
End: 2025-03-20
Payer: MEDICAID

## 2025-03-20 VITALS
BODY MASS INDEX: 26.87 KG/M2 | OXYGEN SATURATION: 98 % | SYSTOLIC BLOOD PRESSURE: 144 MMHG | WEIGHT: 216.1 LBS | DIASTOLIC BLOOD PRESSURE: 84 MMHG | HEART RATE: 93 BPM | HEIGHT: 75 IN

## 2025-03-20 DIAGNOSIS — L02.215 PERINEAL ABSCESS: Primary | ICD-10-CM

## 2025-03-20 PROCEDURE — 99024 POSTOP FOLLOW-UP VISIT: CPT | Performed by: PHYSICIAN ASSISTANT

## 2025-03-20 NOTE — PROGRESS NOTES
"Fawad Laird is a 49 y.o. male in for follow up of Perineal abscess    History of Present Illness  Pt is S/P I&D of a chronic perineal abscess 01/09/2025 and presents today for a wound-check. During his last office visit, silver nitrate was applied to facilitate healing. Pt states that the area burned for a day afterwards and was tender for awhile. He is feeling okay now and is okay if we need to do another application today, but is not looking forward to it. He denies any other concerns at this time.       /84 (BP Location: Left arm, Patient Position: Sitting, Cuff Size: Adult)   Pulse 93   Ht 190.5 cm (75\")   Wt 98 kg (216 lb 1.6 oz)   SpO2 98%   BMI 27.01 kg/m²   Body mass index is 27.01 kg/m².      PE:   Physical Exam    Physical Exam  Exam conducted with a chaperone present.   Constitutional:       General: He is not in acute distress.     Appearance: He is well-developed.   HENT:      Head: Normocephalic and atraumatic.   Abdominal:      General: There is no distension.      Palpations: Abdomen is soft.   Genitourinary:     Comments: Perineal wound with interval healing. Wound approximately 1-1.25 cm in length and more shallow. Wound bed with pink granulation tissue.   Musculoskeletal:         General: Normal range of motion.   Neurological:      Mental Status: He is alert.   Psychiatric:         Thought Content: Thought content normal.       Assessment & Plan    Assessment:   1. Perineal abscess     - S/P I&D of a chronic perineal abscess 01/09/2025      Plan:  - 2nd application of silver nitrate applied today to promote healing.  - Follow-up in 2 weeks.    "

## 2025-04-03 ENCOUNTER — OFFICE VISIT (OUTPATIENT)
Dept: SURGERY | Facility: CLINIC | Age: 50
End: 2025-04-03
Payer: MEDICAID

## 2025-04-03 VITALS
HEART RATE: 91 BPM | BODY MASS INDEX: 26.98 KG/M2 | WEIGHT: 217 LBS | SYSTOLIC BLOOD PRESSURE: 130 MMHG | DIASTOLIC BLOOD PRESSURE: 80 MMHG | HEIGHT: 75 IN | OXYGEN SATURATION: 99 %

## 2025-04-03 DIAGNOSIS — L02.215 PERINEAL ABSCESS: Primary | ICD-10-CM

## 2025-04-03 PROCEDURE — 99024 POSTOP FOLLOW-UP VISIT: CPT | Performed by: PHYSICIAN ASSISTANT

## 2025-04-03 RX ORDER — VITAMIN E/ALOE VERA
1 GEL (GRAM) TOPICAL NIGHTLY
Qty: 85 G | Refills: 1 | Status: SHIPPED | OUTPATIENT
Start: 2025-04-03 | End: 2025-05-03

## 2025-04-03 NOTE — PROGRESS NOTES
"Fawad Laird is a 50 y.o. male in for follow up of Perineal abscess    Pt is S/P I&D of a chronic perineal abscess 01/09/2025. He has undergone application of silver nitrate during his last 2 office visits. States that he feels like he takes 3 steps forwards and 2 steps back. Has the next few days off of work and agreeable to repeat treatment with silver nitrate if needed.     /80 (BP Location: Left arm, Patient Position: Sitting, Cuff Size: Adult)   Pulse 91   Ht 190.5 cm (75\")   Wt 98.4 kg (217 lb)   SpO2 99%   BMI 27.12 kg/m²   Body mass index is 27.12 kg/m².      PE:   Physical Exam  Exam conducted with a chaperone present.   Constitutional:       General: He is not in acute distress.     Appearance: He is well-developed.   HENT:      Head: Normocephalic and atraumatic.   Abdominal:      General: There is no distension.      Palpations: Abdomen is soft.   Genitourinary:     Comments: Perianal exam: Perineal wound with similar appearance since his last office visit. Wound measuring 1.25 cm with wound bed containing pink granulation tissue.   Musculoskeletal:         General: Normal range of motion.   Neurological:      Mental Status: He is alert.   Psychiatric:         Thought Content: Thought content normal.       Assessment:   1. Perineal abscess    - S/P I&D of a chronic perineal abscess 01/09/2025      Plan:  - 3rd application of silver nitrate applied today to promote healing.  - An Rx for wound gel will be sent to the pharmacy. Pt instructed to apply QHS.   - Follow-up in 4 weeks.   "

## 2025-04-30 ENCOUNTER — OFFICE VISIT (OUTPATIENT)
Dept: SURGERY | Facility: CLINIC | Age: 50
End: 2025-04-30
Payer: MEDICAID

## 2025-04-30 VITALS
DIASTOLIC BLOOD PRESSURE: 78 MMHG | OXYGEN SATURATION: 99 % | SYSTOLIC BLOOD PRESSURE: 122 MMHG | HEART RATE: 83 BPM | BODY MASS INDEX: 25.98 KG/M2 | WEIGHT: 208.9 LBS | HEIGHT: 75 IN

## 2025-04-30 DIAGNOSIS — L02.215 PERINEAL ABSCESS: Primary | ICD-10-CM

## 2025-04-30 PROCEDURE — 99024 POSTOP FOLLOW-UP VISIT: CPT | Performed by: PHYSICIAN ASSISTANT

## 2025-04-30 NOTE — PROGRESS NOTES
"Fawad Laird is a 50 y.o. male in for follow up of Perineal abscess    History of Present Illness  Pt is S/P I&D of a chronic perineal abscess 01/09/2025. He has undergone application of silver nitrate during his last 2 office visits. States that he feels like he takes 3 steps forwards and 2 steps back. Has the next few days off of work and agreeable to repeat treatment with silver nitrate if needed.     Last o.v. 04/03/2025 - wound gel rx. Picked up and started applying 2 days later. Has dieter using it nightly, except missed maybe 2 days. He states visually it appears to be healinbg, but area still a little tender with deep palpation.      /78 (BP Location: Left arm, Patient Position: Sitting, Cuff Size: Adult)   Pulse 83   Ht 190.5 cm (75\")   Wt 94.8 kg (208 lb 14.4 oz)   SpO2 99%   BMI 26.11 kg/m²   Body mass index is 26.11 kg/m².      PE:   Physical Exam    Physical Exam  Exam conducted with a chaperone present.   Constitutional:       General: He is not in acute distress.     Appearance: He is well-developed.   HENT:      Head: Normocephalic and atraumatic.   Abdominal:      General: There is no distension.      Palpations: Abdomen is soft.   Genitourinary:     Comments: Perianal exam: Perineal wound has fully epithelialized. No open wounds, bleeding, or drainage present.     Musculoskeletal:         General: Normal range of motion.   Neurological:      Mental Status: He is alert.   Psychiatric:         Thought Content: Thought content normal.       Assessment & Plan    Assessment:   1. Perineal abscess    - S/P I&D of a chronic perineal abscess 01/09/2025    Plan:   - Pt has fully healed from his perineal abscess I&D. He is encouraged to continue to apply the wound gel for another 2 weeks to ensure healing. After 2 weeks, he may stop the wound gel. Strict return precautions discussed.   - Pt is due for a colonoscopy and this has been offered to be scheduled for him during multiple previous office " visits. Pt to contact our office when he is ready to schedule.   - Follow up as needed.

## 2025-07-07 ENCOUNTER — HOSPITAL ENCOUNTER (EMERGENCY)
Facility: HOSPITAL | Age: 50
Discharge: HOME | End: 2025-07-07
Attending: EMERGENCY MEDICINE | Admitting: EMERGENCY MEDICINE
Payer: MEDICAID

## 2025-07-07 ENCOUNTER — APPOINTMENT (OUTPATIENT)
Dept: GENERAL RADIOLOGY | Facility: HOSPITAL | Age: 50
End: 2025-07-07
Payer: MEDICAID

## 2025-07-07 VITALS
HEART RATE: 82 BPM | DIASTOLIC BLOOD PRESSURE: 88 MMHG | OXYGEN SATURATION: 100 % | RESPIRATION RATE: 18 BRPM | TEMPERATURE: 98.3 F | SYSTOLIC BLOOD PRESSURE: 138 MMHG
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
## 2025-07-07 DIAGNOSIS — S80.12XA CONTUSION OF LEFT LOWER LEG, INITIAL ENCOUNTER: Primary | ICD-10-CM

## 2025-07-07 PROCEDURE — 73590 X-RAY EXAM OF LOWER LEG: CPT

## 2025-07-07 PROCEDURE — 99283 EMERGENCY DEPT VISIT LOW MDM: CPT

## 2025-07-07 PROCEDURE — 73560 X-RAY EXAM OF KNEE 1 OR 2: CPT
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
## 2025-07-07 NOTE — DISCHARGE INSTRUCTIONS
Apply cold compresses to affected area as needed off and on for the next 24 hours.  Take Tylenol, ibuprofen, or naproxen as needed for pain.

## (undated) DEVICE — PENCL ES MEGADINE EZ/CLEAN BUTN W/HOLSTR 10FT

## (undated) DEVICE — SYR LL TP 10ML STRL

## (undated) DEVICE — LOU MINOR PROCEDURE: Brand: MEDLINE INDUSTRIES, INC.

## (undated) DEVICE — SPNG GZ WOVN 4X4IN 12PLY 10/BX STRL

## (undated) DEVICE — CATH IV INSYTE AUTOGARD BLD/CONTRL SHLD 22G 1IN BLU

## (undated) DEVICE — GLV SURG SENSICARE PI LF PF 7.5 GRN STRL

## (undated) DEVICE — PREP SOL POVIDONE/IODINE BT 4OZ

## (undated) DEVICE — SPONGE,LAP,18"X18",DLX,XR,ST,5/PK,40/PK: Brand: MEDLINE

## (undated) DEVICE — SMOKE EVACUATION TUBING WITH 8 IN INTEGRAL WAND AND SPONGE GUARD: Brand: BUFFALO FILTER

## (undated) DEVICE — ANTIBACTERIAL UNDYED BRAIDED (POLYGLACTIN 910), SYNTHETIC ABSORBABLE SUTURE: Brand: COATED VICRYL

## (undated) DEVICE — PATIENT RETURN ELECTRODE, SINGLE-USE, CONTACT QUALITY MONITORING, ADULT, WITH 9FT CORD, FOR PATIENTS WEIGING OVER 33LBS. (15KG): Brand: MEGADYNE

## (undated) DEVICE — GOWN SURG AERO CHROME XL

## (undated) DEVICE — DRAPE,UTILITY,TAPE,15X26,STERILE: Brand: MEDLINE

## (undated) DEVICE — STERILE LATEX POWDER-FREE SURGICAL GLOVESWITH NITRILE COATING: Brand: PROTEXIS